# Patient Record
Sex: MALE | Race: WHITE | Employment: OTHER | URBAN - METROPOLITAN AREA
[De-identification: names, ages, dates, MRNs, and addresses within clinical notes are randomized per-mention and may not be internally consistent; named-entity substitution may affect disease eponyms.]

---

## 2017-01-12 DIAGNOSIS — N18.30 TYPE 2 DIABETES MELLITUS WITH STAGE 3 CHRONIC KIDNEY DISEASE (H): Primary | ICD-10-CM

## 2017-01-12 DIAGNOSIS — E11.22 TYPE 2 DIABETES MELLITUS WITH STAGE 3 CHRONIC KIDNEY DISEASE (H): Primary | ICD-10-CM

## 2017-01-12 NOTE — TELEPHONE ENCOUNTER
Refill request from [facility] pharmacy for B-D Pen NDL SHRT 31 Gx8mm(5/16)HERB.    Please inicade patient uses qid, thank you.    Last Filled: 12/04/16

## 2017-01-17 RX ORDER — PEN NEEDLE, DIABETIC 30 GX5/16"
NEEDLE, DISPOSABLE MISCELLANEOUS
Qty: 400 EACH | Refills: 0 | Status: SHIPPED | OUTPATIENT
Start: 2017-01-17

## 2017-01-17 NOTE — TELEPHONE ENCOUNTER
Last OV 7/13/16.   Prescription approved per [facility] Refill Protocol.

## 2017-02-23 ENCOUNTER — TELEPHONE (OUTPATIENT)
Dept: INTERNAL MEDICINE | Facility: CLINIC | Age: 67
End: 2017-02-23

## 2017-02-23 NOTE — TELEPHONE ENCOUNTER
Panel Management Review      Patient has the following on his problem list:     Hypertension   Last three blood pressure readings:  BP Readings from Last 3 Encounters:   08/01/16 124/64   07/19/16 144/82   07/15/16 140/62     Blood pressure: Passed    HTN Guidelines:  Age 18-59 BP range:  Less than 140/90  Age 60-85 with Diabetes:  Less than 140/90  Age 60-85 without Diabetes:  less than 150/90      Composite cancer screening  Chart review shows that this patient is due/due soon for the following Colonoscopy  Summary:    Patient is due/failing the following:   COLONOSCOPY and FIT    Action needed:   Patient needs office visit for Physical and fasting labs.    Type of outreach:    Phone, spoke to patient.  reminded patient due for physical and fastng labs    Questions for provider review:    None                                                                                                                                    LAURE Toussaint L.P.N.       Chart routed to NONE .

## 2017-02-23 NOTE — LETTER
[facility]  [address], [address]  [place], [place]  [address]                                            TEL:[phone]  FAX:[phone]      Ismael Fry  [address] [address] [address]      February 23, 2017    Dear Ismael,      At [facility], we care about your health and well-being. A review of your chart has indicated that you are due for a PHYSICAL with fasting lab work. Please contact us at [phone] to schedule an appointment.     If you have already had one or all of the above screening tests at another facility, please call us to update your chart.        Sincerely,      Bill Reed M.D.

## 2017-05-08 DIAGNOSIS — I10 BENIGN ESSENTIAL HYPERTENSION: ICD-10-CM

## 2017-05-08 RX ORDER — LOSARTAN POTASSIUM 50 MG/1
50 TABLET ORAL DAILY
Qty: 90 TABLET | Refills: 3 | OUTPATIENT
Start: 2017-05-08

## 2017-06-28 ENCOUNTER — TELEPHONE (OUTPATIENT)
Dept: INTERNAL MEDICINE | Facility: CLINIC | Age: 67
End: 2017-06-28

## 2017-06-28 NOTE — TELEPHONE ENCOUNTER
Panel Management Review      Patient has the following on his problem list:     Diabetes    ASA: Passed    Last A1C  Lab Results   Component Value Date    A1C 9.7 06/01/2016    A1C 7.6 02/05/2016    A1C 7.7 08/26/2015    A1C 8.0 02/27/2015    A1C 8.9 03/04/2014     A1C tested: MONITOR    Last LDL:    Lab Results   Component Value Date    CHOL 184 02/05/2016     Lab Results   Component Value Date    HDL 39 02/05/2016     Lab Results   Component Value Date    LDL 86 02/05/2016     Lab Results   Component Value Date    TRIG 293 02/05/2016     Lab Results   Component Value Date    CHOLHDLRATIO 5.8 02/27/2015     Lab Results   Component Value Date    NHDL 145 02/05/2016       Is the patient on a Statin? YES             Is the patient on Aspirin? YES    Medications     HMG CoA Reductase Inhibitors    atorvastatin (LIPITOR) 10 MG tablet    Salicylates    ASPIRIN 325 MG OR TBEC          Last three blood pressure readings:  BP Readings from Last 3 Encounters:   08/01/16 124/64   07/19/16 144/82   07/15/16 140/62       Date of last diabetes office visit: 07/13/2016     Tobacco History:     History   Smoking Status     Former Smoker     Years: 12.00     Types: Cigars   Smokeless Tobacco     Never Used     Comment: jackie 06/09         Hypertension   Last three blood pressure readings:  BP Readings from Last 3 Encounters:   08/01/16 124/64   07/19/16 144/82   07/15/16 140/62     Blood pressure: Monitor    HTN Guidelines:  Age 18-59 BP range:  Less than 140/90  Age 60-85 with Diabetes:  Less than 140/90  Age 60-85 without Diabetes:  less than 150/90      Composite cancer screening  Chart review shows that this patient is due/due soon for the following Colonoscopy  Summary:    Patient is due/failing the following:   A1C, BP CHECK and COLONOSCOPY ( overdue for OV)    Action needed:   none    Type of outreach:    Phone, left message for patient to call back.      Questions for provider review:    None                                                                                                                                     Joie Villareal MA       Chart routed to none .

## 2017-06-28 NOTE — LETTER
[facility]  [address], [address]  [place] [address]  [phone]        August 15, 2017    Ismael Fry  [address] [address] [address]            Dear Mr. Ismael Fry:      At [facility] we care about your health and well-being. In order to ensure we are providing the best quality care, we have reviewed your chart and see that you are due for:    1. Blood pressure check  2. Diabetes check  3. Medication review     Please call [phone] to schedule an appointment        Sincerely,        Bill Reed M.D.

## 2017-10-02 ENCOUNTER — TRANSFERRED RECORDS (OUTPATIENT)
Dept: HEALTH INFORMATION MANAGEMENT | Facility: CLINIC | Age: 67
End: 2017-10-02

## 2019-12-08 ENCOUNTER — HEALTH MAINTENANCE LETTER (OUTPATIENT)
Age: 69
End: 2019-12-08

## 2020-03-15 ENCOUNTER — HEALTH MAINTENANCE LETTER (OUTPATIENT)
Age: 70
End: 2020-03-15

## 2021-01-09 ENCOUNTER — HEALTH MAINTENANCE LETTER (OUTPATIENT)
Age: 71
End: 2021-01-09

## 2021-05-08 ENCOUNTER — HEALTH MAINTENANCE LETTER (OUTPATIENT)
Age: 71
End: 2021-05-08

## 2021-08-28 ENCOUNTER — HEALTH MAINTENANCE LETTER (OUTPATIENT)
Age: 71
End: 2021-08-28

## 2021-10-23 ENCOUNTER — HEALTH MAINTENANCE LETTER (OUTPATIENT)
Age: 71
End: 2021-10-23

## 2022-01-01 ENCOUNTER — HEALTH MAINTENANCE LETTER (OUTPATIENT)
Age: 72
End: 2022-01-01